# Patient Record
Sex: FEMALE | Race: WHITE | NOT HISPANIC OR LATINO | Employment: OTHER | URBAN - METROPOLITAN AREA
[De-identification: names, ages, dates, MRNs, and addresses within clinical notes are randomized per-mention and may not be internally consistent; named-entity substitution may affect disease eponyms.]

---

## 2018-01-03 ENCOUNTER — APPOINTMENT (OUTPATIENT)
Dept: ADMISSIONS | Facility: MEDICAL CENTER | Age: 63
DRG: 027 | End: 2018-01-03
Attending: NEUROLOGICAL SURGERY
Payer: MEDICARE

## 2018-01-03 RX ORDER — LEVOTHYROXINE SODIUM 0.07 MG/1
75 TABLET ORAL
COMMUNITY

## 2018-01-03 RX ORDER — ACETAMINOPHEN 160 MG
1 TABLET,DISINTEGRATING ORAL DAILY
COMMUNITY

## 2018-01-03 RX ORDER — BRIMONIDINE TARTRATE AND TIMOLOL MALEATE 2; 5 MG/ML; MG/ML
1 SOLUTION OPHTHALMIC 2 TIMES DAILY
COMMUNITY

## 2018-01-03 RX ORDER — ALPRAZOLAM 0.5 MG/1
0.5 TABLET ORAL NIGHTLY PRN
COMMUNITY

## 2018-01-03 RX ORDER — BRINZOLAMIDE 10 MG/ML
1 SUSPENSION/ DROPS OPHTHALMIC 2 TIMES DAILY
COMMUNITY

## 2018-01-03 RX ORDER — SIMVASTATIN 20 MG
20 TABLET ORAL NIGHTLY
COMMUNITY

## 2018-01-03 RX ORDER — OMEPRAZOLE 20 MG/1
20 CAPSULE, DELAYED RELEASE ORAL DAILY
COMMUNITY

## 2018-01-08 ENCOUNTER — APPOINTMENT (OUTPATIENT)
Dept: RADIOLOGY | Facility: MEDICAL CENTER | Age: 63
DRG: 027 | End: 2018-01-08
Attending: NEUROLOGICAL SURGERY
Payer: MEDICARE

## 2018-01-08 ENCOUNTER — HOSPITAL ENCOUNTER (INPATIENT)
Facility: MEDICAL CENTER | Age: 63
LOS: 2 days | DRG: 027 | End: 2018-01-10
Attending: NEUROLOGICAL SURGERY | Admitting: NEUROLOGICAL SURGERY
Payer: MEDICARE

## 2018-01-08 LAB
ABO GROUP BLD: NORMAL
ABO GROUP BLD: NORMAL
BLD GP AB SCN SERPL QL: NORMAL
RH BLD: NORMAL

## 2018-01-08 PROCEDURE — 500445 HCHG HEMOSTAT, SURGICEL 4X8: Performed by: NEUROLOGICAL SURGERY

## 2018-01-08 PROCEDURE — 4A11X4G MONITORING OF PERIPHERAL NERVOUS ELECTRICAL ACTIVITY, INTRAOPERATIVE, EXTERNAL APPROACH: ICD-10-PCS | Performed by: NEUROLOGICAL SURGERY

## 2018-01-08 PROCEDURE — 500303 HCHG CLIP, SCALP: Performed by: NEUROLOGICAL SURGERY

## 2018-01-08 PROCEDURE — C1713 ANCHOR/SCREW BN/BN,TIS/BN: HCPCS | Performed by: NEUROLOGICAL SURGERY

## 2018-01-08 PROCEDURE — 86901 BLOOD TYPING SEROLOGIC RH(D): CPT

## 2018-01-08 PROCEDURE — 95937 NEUROMUSCULAR JUNCTION TEST: CPT | Performed by: NEUROLOGICAL SURGERY

## 2018-01-08 PROCEDURE — 95867 NDL EMG CRANIAL NRV MUSC UNI: CPT | Performed by: NEUROLOGICAL SURGERY

## 2018-01-08 PROCEDURE — 700102 HCHG RX REV CODE 250 W/ 637 OVERRIDE(OP)

## 2018-01-08 PROCEDURE — 88307 TISSUE EXAM BY PATHOLOGIST: CPT

## 2018-01-08 PROCEDURE — A9270 NON-COVERED ITEM OR SERVICE: HCPCS | Performed by: NEUROLOGICAL SURGERY

## 2018-01-08 PROCEDURE — 700101 HCHG RX REV CODE 250

## 2018-01-08 PROCEDURE — 110454 HCHG SHELL REV 250: Performed by: NEUROLOGICAL SURGERY

## 2018-01-08 PROCEDURE — 160035 HCHG PACU - 1ST 60 MINS PHASE I: Performed by: NEUROLOGICAL SURGERY

## 2018-01-08 PROCEDURE — 160042 HCHG SURGERY MINUTES - EA ADDL 1 MIN LEVEL 5: Performed by: NEUROLOGICAL SURGERY

## 2018-01-08 PROCEDURE — 95940 IONM IN OPERATNG ROOM 15 MIN: CPT | Performed by: NEUROLOGICAL SURGERY

## 2018-01-08 PROCEDURE — 770022 HCHG ROOM/CARE - ICU (200)

## 2018-01-08 PROCEDURE — 160048 HCHG OR STATISTICAL LEVEL 1-5: Performed by: NEUROLOGICAL SURGERY

## 2018-01-08 PROCEDURE — 501838 HCHG SUTURE GENERAL: Performed by: NEUROLOGICAL SURGERY

## 2018-01-08 PROCEDURE — 700111 HCHG RX REV CODE 636 W/ 250 OVERRIDE (IP): Performed by: NEUROLOGICAL SURGERY

## 2018-01-08 PROCEDURE — 500075 HCHG BLADE, CLIPPER NEURO: Performed by: NEUROLOGICAL SURGERY

## 2018-01-08 PROCEDURE — 160031 HCHG SURGERY MINUTES - 1ST 30 MINS LEVEL 5: Performed by: NEUROLOGICAL SURGERY

## 2018-01-08 PROCEDURE — 8E09XBZ COMPUTER ASSISTED PROCEDURE OF HEAD AND NECK REGION: ICD-10-PCS | Performed by: NEUROLOGICAL SURGERY

## 2018-01-08 PROCEDURE — 500122 HCHG BOVIE, BLADE: Performed by: NEUROLOGICAL SURGERY

## 2018-01-08 PROCEDURE — 86850 RBC ANTIBODY SCREEN: CPT

## 2018-01-08 PROCEDURE — C1725 CATH, TRANSLUMIN NON-LASER: HCPCS | Performed by: NEUROLOGICAL SURGERY

## 2018-01-08 PROCEDURE — 700111 HCHG RX REV CODE 636 W/ 250 OVERRIDE (IP)

## 2018-01-08 PROCEDURE — 700102 HCHG RX REV CODE 250 W/ 637 OVERRIDE(OP): Performed by: NEUROLOGICAL SURGERY

## 2018-01-08 PROCEDURE — A6222 GAUZE <=16 IN NO W/SAL W/O B: HCPCS | Performed by: NEUROLOGICAL SURGERY

## 2018-01-08 PROCEDURE — 700105 HCHG RX REV CODE 258: Performed by: NEUROLOGICAL SURGERY

## 2018-01-08 PROCEDURE — 160002 HCHG RECOVERY MINUTES (STAT): Performed by: NEUROLOGICAL SURGERY

## 2018-01-08 PROCEDURE — 95870 NDL EMG LMTD STD MUSC 1 XTR: CPT | Performed by: NEUROLOGICAL SURGERY

## 2018-01-08 PROCEDURE — A9270 NON-COVERED ITEM OR SERVICE: HCPCS

## 2018-01-08 PROCEDURE — 70553 MRI BRAIN STEM W/O & W/DYE: CPT

## 2018-01-08 PROCEDURE — 500331 HCHG COTTONOID, SURG PATTIE: Performed by: NEUROLOGICAL SURGERY

## 2018-01-08 PROCEDURE — 00BC0ZZ EXCISION OF CEREBELLUM, OPEN APPROACH: ICD-10-PCS | Performed by: NEUROLOGICAL SURGERY

## 2018-01-08 PROCEDURE — 95938 SOMATOSENSORY TESTING: CPT | Performed by: NEUROLOGICAL SURGERY

## 2018-01-08 PROCEDURE — 160009 HCHG ANES TIME/MIN: Performed by: NEUROLOGICAL SURGERY

## 2018-01-08 PROCEDURE — 86900 BLOOD TYPING SEROLOGIC ABO: CPT

## 2018-01-08 PROCEDURE — A6404 STERILE GAUZE > 48 SQ IN: HCPCS | Performed by: NEUROLOGICAL SURGERY

## 2018-01-08 PROCEDURE — 500889 HCHG PACK, NEURO: Performed by: NEUROLOGICAL SURGERY

## 2018-01-08 PROCEDURE — 501445 HCHG STAPLER, SKIN DISP: Performed by: NEUROLOGICAL SURGERY

## 2018-01-08 DEVICE — GRAFT DURAMATRIX ONLAY PLUS 1IN X 1IN OR 2.7CM X 2.75CM: Type: IMPLANTABLE DEVICE | Status: FUNCTIONAL

## 2018-01-08 DEVICE — PLATE NC DOGBONE 2-HOLE RIGID GOLD 0.6MM (6NCX4=24): Type: IMPLANTABLE DEVICE | Status: FUNCTIONAL

## 2018-01-08 DEVICE — TIP EXTENDED DURAL SEALANT AUTOSPRAY ADHERUS (5EA/PK): Type: IMPLANTABLE DEVICE | Status: FUNCTIONAL

## 2018-01-08 DEVICE — SCREW STRYK NC 1.5X5MM (6NCX40=240) (80EA/PK) CONSIGNED QTY 240 PRE-LOAD: Type: IMPLANTABLE DEVICE | Status: FUNCTIONAL

## 2018-01-08 RX ORDER — ENEMA 19; 7 G/133ML; G/133ML
1 ENEMA RECTAL
Status: DISCONTINUED | OUTPATIENT
Start: 2018-01-08 | End: 2018-01-10 | Stop reason: HOSPADM

## 2018-01-08 RX ORDER — GINSENG 100 MG
CAPSULE ORAL
Status: DISCONTINUED | OUTPATIENT
Start: 2018-01-08 | End: 2018-01-08 | Stop reason: HOSPADM

## 2018-01-08 RX ORDER — HYDROMORPHONE HYDROCHLORIDE 2 MG/ML
0.5 INJECTION, SOLUTION INTRAMUSCULAR; INTRAVENOUS; SUBCUTANEOUS
Status: DISCONTINUED | OUTPATIENT
Start: 2018-01-08 | End: 2018-01-10 | Stop reason: HOSPADM

## 2018-01-08 RX ORDER — BISACODYL 10 MG
10 SUPPOSITORY, RECTAL RECTAL
Status: DISCONTINUED | OUTPATIENT
Start: 2018-01-08 | End: 2018-01-10 | Stop reason: HOSPADM

## 2018-01-08 RX ORDER — SIMVASTATIN 20 MG
20 TABLET ORAL NIGHTLY
Status: DISCONTINUED | OUTPATIENT
Start: 2018-01-08 | End: 2018-01-10 | Stop reason: HOSPADM

## 2018-01-08 RX ORDER — BRIMONIDINE TARTRATE AND TIMOLOL MALEATE 2; 5 MG/ML; MG/ML
1 SOLUTION OPHTHALMIC 2 TIMES DAILY
Status: DISCONTINUED | OUTPATIENT
Start: 2018-01-08 | End: 2018-01-10 | Stop reason: HOSPADM

## 2018-01-08 RX ORDER — OXYCODONE HYDROCHLORIDE 10 MG/1
10 TABLET ORAL
Status: DISCONTINUED | OUTPATIENT
Start: 2018-01-08 | End: 2018-01-10 | Stop reason: HOSPADM

## 2018-01-08 RX ORDER — CEFAZOLIN SODIUM 2 G/100ML
2 INJECTION, SOLUTION INTRAVENOUS EVERY 8 HOURS
Status: COMPLETED | OUTPATIENT
Start: 2018-01-08 | End: 2018-01-09

## 2018-01-08 RX ORDER — LIDOCAINE HYDROCHLORIDE 10 MG/ML
INJECTION, SOLUTION INFILTRATION; PERINEURAL
Status: COMPLETED
Start: 2018-01-08 | End: 2018-01-08

## 2018-01-08 RX ORDER — ONDANSETRON 2 MG/ML
4 INJECTION INTRAMUSCULAR; INTRAVENOUS EVERY 4 HOURS PRN
Status: DISCONTINUED | OUTPATIENT
Start: 2018-01-08 | End: 2018-01-10 | Stop reason: HOSPADM

## 2018-01-08 RX ORDER — LATANOPROST 50 UG/ML
1 SOLUTION/ DROPS OPHTHALMIC EVERY EVENING
Status: DISCONTINUED | OUTPATIENT
Start: 2018-01-08 | End: 2018-01-08

## 2018-01-08 RX ORDER — SCOLOPAMINE TRANSDERMAL SYSTEM 1 MG/1
PATCH, EXTENDED RELEASE TRANSDERMAL
Status: DISPENSED
Start: 2018-01-08 | End: 2018-01-08

## 2018-01-08 RX ORDER — HYDRALAZINE HYDROCHLORIDE 20 MG/ML
5-10 INJECTION INTRAMUSCULAR; INTRAVENOUS
Status: DISCONTINUED | OUTPATIENT
Start: 2018-01-08 | End: 2018-01-10 | Stop reason: HOSPADM

## 2018-01-08 RX ORDER — DIPHENHYDRAMINE HYDROCHLORIDE 50 MG/ML
25 INJECTION INTRAMUSCULAR; INTRAVENOUS EVERY 6 HOURS PRN
Status: DISCONTINUED | OUTPATIENT
Start: 2018-01-08 | End: 2018-01-10 | Stop reason: HOSPADM

## 2018-01-08 RX ORDER — POLYETHYLENE GLYCOL 3350 17 G/17G
1 POWDER, FOR SOLUTION ORAL 2 TIMES DAILY PRN
Status: DISCONTINUED | OUTPATIENT
Start: 2018-01-08 | End: 2018-01-10 | Stop reason: HOSPADM

## 2018-01-08 RX ORDER — OMEPRAZOLE 20 MG/1
20 CAPSULE, DELAYED RELEASE ORAL DAILY
Status: DISCONTINUED | OUTPATIENT
Start: 2018-01-09 | End: 2018-01-10 | Stop reason: HOSPADM

## 2018-01-08 RX ORDER — LIDOCAINE HYDROCHLORIDE 10 MG/ML
0.5 INJECTION, SOLUTION INFILTRATION; PERINEURAL
Status: ACTIVE | OUTPATIENT
Start: 2018-01-08 | End: 2018-01-09

## 2018-01-08 RX ORDER — SCOLOPAMINE TRANSDERMAL SYSTEM 1 MG/1
1 PATCH, EXTENDED RELEASE TRANSDERMAL
Status: DISCONTINUED | OUTPATIENT
Start: 2018-01-08 | End: 2018-01-10 | Stop reason: HOSPADM

## 2018-01-08 RX ORDER — ALPRAZOLAM 0.5 MG/1
0.5 TABLET ORAL NIGHTLY PRN
Status: DISCONTINUED | OUTPATIENT
Start: 2018-01-08 | End: 2018-01-10 | Stop reason: HOSPADM

## 2018-01-08 RX ORDER — PROMETHAZINE HYDROCHLORIDE 25 MG/1
6.25 TABLET ORAL EVERY 6 HOURS PRN
Status: DISCONTINUED | OUTPATIENT
Start: 2018-01-08 | End: 2018-01-08

## 2018-01-08 RX ORDER — OXYCODONE HYDROCHLORIDE 5 MG/1
5 TABLET ORAL
Status: DISCONTINUED | OUTPATIENT
Start: 2018-01-08 | End: 2018-01-10 | Stop reason: HOSPADM

## 2018-01-08 RX ORDER — LIDOCAINE AND PRILOCAINE 25; 25 MG/G; MG/G
1 CREAM TOPICAL
Status: ACTIVE | OUTPATIENT
Start: 2018-01-08 | End: 2018-01-09

## 2018-01-08 RX ORDER — DEXAMETHASONE SODIUM PHOSPHATE 4 MG/ML
4 INJECTION, SOLUTION INTRA-ARTICULAR; INTRALESIONAL; INTRAMUSCULAR; INTRAVENOUS; SOFT TISSUE
Status: COMPLETED | OUTPATIENT
Start: 2018-01-08 | End: 2018-01-08

## 2018-01-08 RX ORDER — BRIMONIDINE TARTRATE AND TIMOLOL MALEATE 2; 5 MG/ML; MG/ML
1 SOLUTION OPHTHALMIC 2 TIMES DAILY
Status: DISCONTINUED | OUTPATIENT
Start: 2018-01-08 | End: 2018-01-08

## 2018-01-08 RX ORDER — PROMETHAZINE HYDROCHLORIDE 12.5 MG/1
6.25 SUPPOSITORY RECTAL EVERY 6 HOURS PRN
Status: DISCONTINUED | OUTPATIENT
Start: 2018-01-08 | End: 2018-01-10 | Stop reason: HOSPADM

## 2018-01-08 RX ORDER — AMOXICILLIN 250 MG
1 CAPSULE ORAL
Status: DISCONTINUED | OUTPATIENT
Start: 2018-01-08 | End: 2018-01-10 | Stop reason: HOSPADM

## 2018-01-08 RX ORDER — LIDOCAINE HYDROCHLORIDE AND EPINEPHRINE 10; 10 MG/ML; UG/ML
INJECTION, SOLUTION INFILTRATION; PERINEURAL
Status: DISCONTINUED | OUTPATIENT
Start: 2018-01-08 | End: 2018-01-08 | Stop reason: HOSPADM

## 2018-01-08 RX ORDER — AMOXICILLIN 250 MG
1 CAPSULE ORAL NIGHTLY
Status: DISCONTINUED | OUTPATIENT
Start: 2018-01-08 | End: 2018-01-10 | Stop reason: HOSPADM

## 2018-01-08 RX ORDER — CLONIDINE HYDROCHLORIDE 0.1 MG/1
0.1 TABLET ORAL EVERY 4 HOURS PRN
Status: DISCONTINUED | OUTPATIENT
Start: 2018-01-08 | End: 2018-01-10 | Stop reason: HOSPADM

## 2018-01-08 RX ORDER — SODIUM CHLORIDE 9 MG/ML
INJECTION, SOLUTION INTRAVENOUS CONTINUOUS
Status: DISCONTINUED | OUTPATIENT
Start: 2018-01-08 | End: 2018-01-08

## 2018-01-08 RX ORDER — LORAZEPAM 2 MG/ML
.5-1 INJECTION INTRAMUSCULAR EVERY 8 HOURS PRN
Status: DISCONTINUED | OUTPATIENT
Start: 2018-01-08 | End: 2018-01-10 | Stop reason: HOSPADM

## 2018-01-08 RX ORDER — DEXAMETHASONE 4 MG/1
4 TABLET ORAL EVERY 6 HOURS
Status: DISCONTINUED | OUTPATIENT
Start: 2018-01-08 | End: 2018-01-10 | Stop reason: HOSPADM

## 2018-01-08 RX ORDER — DOCUSATE SODIUM 100 MG/1
100 CAPSULE, LIQUID FILLED ORAL 2 TIMES DAILY
Status: DISCONTINUED | OUTPATIENT
Start: 2018-01-08 | End: 2018-01-10 | Stop reason: HOSPADM

## 2018-01-08 RX ORDER — LABETALOL HYDROCHLORIDE 5 MG/ML
5-10 INJECTION, SOLUTION INTRAVENOUS
Status: DISCONTINUED | OUTPATIENT
Start: 2018-01-08 | End: 2018-01-10 | Stop reason: HOSPADM

## 2018-01-08 RX ORDER — LEVOTHYROXINE SODIUM 0.07 MG/1
75 TABLET ORAL
Status: DISCONTINUED | OUTPATIENT
Start: 2018-01-09 | End: 2018-01-10 | Stop reason: HOSPADM

## 2018-01-08 RX ORDER — BRINZOLAMIDE 10 MG/ML
1 SUSPENSION/ DROPS OPHTHALMIC 2 TIMES DAILY
Status: DISCONTINUED | OUTPATIENT
Start: 2018-01-08 | End: 2018-01-10 | Stop reason: HOSPADM

## 2018-01-08 RX ORDER — BACITRACIN 50000 [IU]/1
INJECTION, POWDER, FOR SOLUTION INTRAMUSCULAR
Status: DISCONTINUED | OUTPATIENT
Start: 2018-01-08 | End: 2018-01-08 | Stop reason: HOSPADM

## 2018-01-08 RX ORDER — ACETAMINOPHEN 500 MG
1000 TABLET ORAL EVERY 6 HOURS
Status: DISCONTINUED | OUTPATIENT
Start: 2018-01-08 | End: 2018-01-10 | Stop reason: HOSPADM

## 2018-01-08 RX ORDER — SODIUM CHLORIDE 9 MG/ML
INJECTION, SOLUTION INTRAVENOUS CONTINUOUS
Status: DISCONTINUED | OUTPATIENT
Start: 2018-01-08 | End: 2018-01-10 | Stop reason: HOSPADM

## 2018-01-08 RX ORDER — BRINZOLAMIDE 10 MG/ML
1 SUSPENSION/ DROPS OPHTHALMIC 2 TIMES DAILY
Status: DISCONTINUED | OUTPATIENT
Start: 2018-01-08 | End: 2018-01-08

## 2018-01-08 RX ADMIN — ONDANSETRON 4 MG: 2 INJECTION INTRAMUSCULAR; INTRAVENOUS at 15:56

## 2018-01-08 RX ADMIN — ONDANSETRON 4 MG: 2 INJECTION INTRAMUSCULAR; INTRAVENOUS at 20:11

## 2018-01-08 RX ADMIN — SCOLOPAMINE TRANSDERMAL SYSTEM 1 PATCH: 1 PATCH, EXTENDED RELEASE TRANSDERMAL at 17:16

## 2018-01-08 RX ADMIN — CEFAZOLIN SODIUM 2 G: 2 INJECTION, SOLUTION INTRAVENOUS at 23:35

## 2018-01-08 RX ADMIN — LIDOCAINE HYDROCHLORIDE 0.5 ML: 10 INJECTION, SOLUTION INFILTRATION; PERINEURAL at 06:15

## 2018-01-08 RX ADMIN — WHITE PETROLATUM MINERAL OIL 1 APPLICATION: 150; 830 OINTMENT OPHTHALMIC at 20:44

## 2018-01-08 RX ADMIN — FENTANYL CITRATE 50 MCG: 50 INJECTION, SOLUTION INTRAMUSCULAR; INTRAVENOUS at 15:16

## 2018-01-08 RX ADMIN — LATANOPROST 1 DROP: 50 SOLUTION OPHTHALMIC at 20:01

## 2018-01-08 RX ADMIN — SODIUM CHLORIDE: 9 INJECTION, SOLUTION INTRAVENOUS at 16:03

## 2018-01-08 RX ADMIN — BRINZOLAMIDE 1 DROP: 10 SUSPENSION/ DROPS OPHTHALMIC at 21:44

## 2018-01-08 RX ADMIN — LORAZEPAM 1 MG: 2 INJECTION, SOLUTION INTRAMUSCULAR; INTRAVENOUS at 21:31

## 2018-01-08 RX ADMIN — CEFAZOLIN SODIUM 2 G: 2 INJECTION, SOLUTION INTRAVENOUS at 17:32

## 2018-01-08 RX ADMIN — BRIMONIDINE TARTRATE AND TIMOLOL MALEATE 1 DROP: 2; 5 SOLUTION OPHTHALMIC at 21:43

## 2018-01-08 RX ADMIN — PROCHLORPERAZINE EDISYLATE 5 MG: 5 INJECTION INTRAMUSCULAR; INTRAVENOUS at 20:40

## 2018-01-08 RX ADMIN — SODIUM CHLORIDE: 9 INJECTION, SOLUTION INTRAVENOUS at 06:15

## 2018-01-08 RX ADMIN — DIPHENHYDRAMINE HYDROCHLORIDE 25 MG: 50 INJECTION, SOLUTION INTRAMUSCULAR; INTRAVENOUS at 16:32

## 2018-01-08 RX ADMIN — PROMETHAZINE HYDROCHLORIDE 6.25 MG: 12.5 SUPPOSITORY RECTAL at 17:16

## 2018-01-08 RX ADMIN — DEXAMETHASONE SODIUM PHOSPHATE 4 MG: 4 INJECTION, SOLUTION INTRAMUSCULAR; INTRAVENOUS at 16:44

## 2018-01-08 RX ADMIN — PROCHLORPERAZINE EDISYLATE 5 MG: 5 INJECTION INTRAMUSCULAR; INTRAVENOUS at 18:09

## 2018-01-08 ASSESSMENT — PAIN SCALES - GENERAL
PAINLEVEL_OUTOF10: 0
PAINLEVEL_OUTOF10: 1
PAINLEVEL_OUTOF10: 0
PAINLEVEL_OUTOF10: 4
PAINLEVEL_OUTOF10: 1
PAINLEVEL_OUTOF10: 0
PAINLEVEL_OUTOF10: 2
PAINLEVEL_OUTOF10: 6
PAINLEVEL_OUTOF10: 2

## 2018-01-08 ASSESSMENT — PATIENT HEALTH QUESTIONNAIRE - PHQ9
1. LITTLE INTEREST OR PLEASURE IN DOING THINGS: NOT AT ALL
SUM OF ALL RESPONSES TO PHQ QUESTIONS 1-9: 0
SUM OF ALL RESPONSES TO PHQ9 QUESTIONS 1 AND 2: 0

## 2018-01-09 ENCOUNTER — APPOINTMENT (OUTPATIENT)
Dept: RADIOLOGY | Facility: MEDICAL CENTER | Age: 63
DRG: 027 | End: 2018-01-09
Attending: NEUROLOGICAL SURGERY
Payer: MEDICARE

## 2018-01-09 PROBLEM — D33.1 BENIGN NEOPLASM OF INFRATENTORIAL REGION OF BRAIN (HCC): Status: ACTIVE | Noted: 2018-01-09

## 2018-01-09 LAB
ANION GAP SERPL CALC-SCNC: 7 MMOL/L (ref 0–11.9)
BUN SERPL-MCNC: 8 MG/DL (ref 8–22)
CALCIUM SERPL-MCNC: 8.1 MG/DL (ref 8.5–10.5)
CHLORIDE SERPL-SCNC: 110 MMOL/L (ref 96–112)
CO2 SERPL-SCNC: 22 MMOL/L (ref 20–33)
CREAT SERPL-MCNC: 0.54 MG/DL (ref 0.5–1.4)
ERYTHROCYTE [DISTWIDTH] IN BLOOD BY AUTOMATED COUNT: 42.5 FL (ref 35.9–50)
GLUCOSE SERPL-MCNC: 122 MG/DL (ref 65–99)
HCT VFR BLD AUTO: 35.2 % (ref 37–47)
HGB BLD-MCNC: 12.1 G/DL (ref 12–16)
MCH RBC QN AUTO: 31.9 PG (ref 27–33)
MCHC RBC AUTO-ENTMCNC: 34.4 G/DL (ref 33.6–35)
MCV RBC AUTO: 92.9 FL (ref 81.4–97.8)
PLATELET # BLD AUTO: 231 K/UL (ref 164–446)
PMV BLD AUTO: 9.4 FL (ref 9–12.9)
POTASSIUM SERPL-SCNC: 3.2 MMOL/L (ref 3.6–5.5)
RBC # BLD AUTO: 3.79 M/UL (ref 4.2–5.4)
SODIUM SERPL-SCNC: 139 MMOL/L (ref 135–145)
WBC # BLD AUTO: 10.8 K/UL (ref 4.8–10.8)

## 2018-01-09 PROCEDURE — G8978 MOBILITY CURRENT STATUS: HCPCS | Mod: CJ

## 2018-01-09 PROCEDURE — 700112 HCHG RX REV CODE 229: Performed by: NEUROLOGICAL SURGERY

## 2018-01-09 PROCEDURE — A9579 GAD-BASE MR CONTRAST NOS,1ML: HCPCS | Performed by: NEUROLOGICAL SURGERY

## 2018-01-09 PROCEDURE — G8979 MOBILITY GOAL STATUS: HCPCS | Mod: CI

## 2018-01-09 PROCEDURE — 700111 HCHG RX REV CODE 636 W/ 250 OVERRIDE (IP): Performed by: NEUROLOGICAL SURGERY

## 2018-01-09 PROCEDURE — 700105 HCHG RX REV CODE 258: Performed by: NEUROLOGICAL SURGERY

## 2018-01-09 PROCEDURE — G8987 SELF CARE CURRENT STATUS: HCPCS | Mod: CJ

## 2018-01-09 PROCEDURE — 700117 HCHG RX CONTRAST REV CODE 255: Performed by: NEUROLOGICAL SURGERY

## 2018-01-09 PROCEDURE — 700102 HCHG RX REV CODE 250 W/ 637 OVERRIDE(OP): Performed by: NEUROLOGICAL SURGERY

## 2018-01-09 PROCEDURE — 770001 HCHG ROOM/CARE - MED/SURG/GYN PRIV*

## 2018-01-09 PROCEDURE — 80048 BASIC METABOLIC PNL TOTAL CA: CPT

## 2018-01-09 PROCEDURE — 70553 MRI BRAIN STEM W/O & W/DYE: CPT

## 2018-01-09 PROCEDURE — 97162 PT EVAL MOD COMPLEX 30 MIN: CPT

## 2018-01-09 PROCEDURE — A9270 NON-COVERED ITEM OR SERVICE: HCPCS | Performed by: NEUROLOGICAL SURGERY

## 2018-01-09 PROCEDURE — G8988 SELF CARE GOAL STATUS: HCPCS | Mod: CI

## 2018-01-09 PROCEDURE — 85027 COMPLETE CBC AUTOMATED: CPT

## 2018-01-09 PROCEDURE — 97165 OT EVAL LOW COMPLEX 30 MIN: CPT

## 2018-01-09 RX ORDER — SILICONES/ADHESIVE TAPE
COMBINATION PACKAGE (EA) TOPICAL 2 TIMES DAILY
Status: DISCONTINUED | OUTPATIENT
Start: 2018-01-09 | End: 2018-01-10 | Stop reason: HOSPADM

## 2018-01-09 RX ORDER — POTASSIUM CHLORIDE 20 MEQ/1
40 TABLET, EXTENDED RELEASE ORAL DAILY
Status: COMPLETED | OUTPATIENT
Start: 2018-01-09 | End: 2018-01-10

## 2018-01-09 RX ADMIN — SODIUM CHLORIDE: 9 INJECTION, SOLUTION INTRAVENOUS at 02:16

## 2018-01-09 RX ADMIN — PROCHLORPERAZINE EDISYLATE 5 MG: 5 INJECTION INTRAMUSCULAR; INTRAVENOUS at 17:27

## 2018-01-09 RX ADMIN — ACETAMINOPHEN 1000 MG: 500 TABLET, FILM COATED ORAL at 17:27

## 2018-01-09 RX ADMIN — BACITRACIN ZINC AND POLYMYXIN B SULFATE: 500; 10000 OINTMENT TOPICAL at 21:00

## 2018-01-09 RX ADMIN — POTASSIUM CHLORIDE 40 MEQ: 1500 TABLET, EXTENDED RELEASE ORAL at 08:35

## 2018-01-09 RX ADMIN — BRIMONIDINE TARTRATE AND TIMOLOL MALEATE 1 DROP: 2; 5 SOLUTION OPHTHALMIC at 18:42

## 2018-01-09 RX ADMIN — ALPRAZOLAM 0.5 MG: 0.5 TABLET ORAL at 00:07

## 2018-01-09 RX ADMIN — BRINZOLAMIDE 1 DROP: 10 SUSPENSION/ DROPS OPHTHALMIC at 08:38

## 2018-01-09 RX ADMIN — STANDARDIZED SENNA CONCENTRATE AND DOCUSATE SODIUM 1 TABLET: 8.6; 5 TABLET, FILM COATED ORAL at 19:54

## 2018-01-09 RX ADMIN — VITAMIN D, TAB 1000IU (100/BT) 1000 UNITS: 25 TAB at 08:35

## 2018-01-09 RX ADMIN — SIMVASTATIN 20 MG: 20 TABLET, FILM COATED ORAL at 21:00

## 2018-01-09 RX ADMIN — ACETAMINOPHEN 1000 MG: 500 TABLET, FILM COATED ORAL at 23:12

## 2018-01-09 RX ADMIN — DEXAMETHASONE 4 MG: 4 TABLET ORAL at 13:07

## 2018-01-09 RX ADMIN — OMEPRAZOLE 20 MG: 20 CAPSULE, DELAYED RELEASE ORAL at 08:35

## 2018-01-09 RX ADMIN — BRIMONIDINE TARTRATE AND TIMOLOL MALEATE 1 DROP: 2; 5 SOLUTION OPHTHALMIC at 08:37

## 2018-01-09 RX ADMIN — DEXAMETHASONE 4 MG: 4 TABLET ORAL at 17:27

## 2018-01-09 RX ADMIN — ONDANSETRON 4 MG: 2 INJECTION INTRAMUSCULAR; INTRAVENOUS at 07:21

## 2018-01-09 RX ADMIN — BACITRACIN ZINC AND POLYMYXIN B SULFATE: 500; 10000 OINTMENT TOPICAL at 15:09

## 2018-01-09 RX ADMIN — GADODIAMIDE 20 ML: 287 INJECTION INTRAVENOUS at 23:07

## 2018-01-09 RX ADMIN — BRINZOLAMIDE 1 DROP: 10 SUSPENSION/ DROPS OPHTHALMIC at 18:26

## 2018-01-09 RX ADMIN — DOCUSATE SODIUM 100 MG: 100 CAPSULE ORAL at 19:54

## 2018-01-09 RX ADMIN — ACETAMINOPHEN 1000 MG: 500 TABLET, FILM COATED ORAL at 08:56

## 2018-01-09 RX ADMIN — PROCHLORPERAZINE EDISYLATE 5 MG: 5 INJECTION INTRAMUSCULAR; INTRAVENOUS at 07:37

## 2018-01-09 ASSESSMENT — LIFESTYLE VARIABLES
HOW MANY TIMES IN THE PAST YEAR HAVE YOU HAD 5 OR MORE DRINKS IN A DAY: 7
HAVE PEOPLE ANNOYED YOU BY CRITICIZING YOUR DRINKING: NO
AVERAGE NUMBER OF DAYS PER WEEK YOU HAVE A DRINK CONTAINING ALCOHOL: 7
CONSUMPTION TOTAL: POSITIVE
HAVE YOU EVER FELT YOU SHOULD CUT DOWN ON YOUR DRINKING: NO
EVER FELT BAD OR GUILTY ABOUT YOUR DRINKING: NO
TOTAL SCORE: 0
TOTAL SCORE: 0
EVER HAD A DRINK FIRST THING IN THE MORNING TO STEADY YOUR NERVES TO GET RID OF A HANGOVER: NO
DO YOU DRINK ALCOHOL: YES
EVER_SMOKED: YES
TOTAL SCORE: 0
ON A TYPICAL DAY WHEN YOU DRINK ALCOHOL HOW MANY DRINKS DO YOU HAVE: 2

## 2018-01-09 ASSESSMENT — PAIN SCALES - GENERAL
PAINLEVEL_OUTOF10: 4
PAINLEVEL_OUTOF10: 3
PAINLEVEL_OUTOF10: 2
PAINLEVEL_OUTOF10: 1
PAINLEVEL_OUTOF10: 6
PAINLEVEL_OUTOF10: 7
PAINLEVEL_OUTOF10: 3
PAINLEVEL_OUTOF10: 1
PAINLEVEL_OUTOF10: 1
PAINLEVEL_OUTOF10: 0
PAINLEVEL_OUTOF10: 5
PAINLEVEL_OUTOF10: 0

## 2018-01-09 ASSESSMENT — COGNITIVE AND FUNCTIONAL STATUS - GENERAL
TOILETING: A LITTLE
DAILY ACTIVITIY SCORE: 21
MOBILITY SCORE: 18
TURNING FROM BACK TO SIDE WHILE IN FLAT BAD: A LITTLE
DRESSING REGULAR LOWER BODY CLOTHING: A LITTLE
STANDING UP FROM CHAIR USING ARMS: A LITTLE
CLIMB 3 TO 5 STEPS WITH RAILING: A LITTLE
SUGGESTED CMS G CODE MODIFIER MOBILITY: CK
MOVING TO AND FROM BED TO CHAIR: A LITTLE
SUGGESTED CMS G CODE MODIFIER DAILY ACTIVITY: CJ
HELP NEEDED FOR BATHING: A LITTLE
MOVING FROM LYING ON BACK TO SITTING ON SIDE OF FLAT BED: A LITTLE
WALKING IN HOSPITAL ROOM: A LITTLE

## 2018-01-09 ASSESSMENT — GAIT ASSESSMENTS
DEVIATION: SHUFFLED GAIT;BRADYKINETIC
GAIT LEVEL OF ASSIST: CONTACT GUARD ASSIST
DISTANCE (FEET): 200

## 2018-01-09 ASSESSMENT — ACTIVITIES OF DAILY LIVING (ADL): TOILETING: INDEPENDENT

## 2018-01-09 NOTE — CARE PLAN
Problem: Communication  Goal: The ability to communicate needs accurately and effectively will improve  Outcome: PROGRESSING AS EXPECTED  Pt oriented to call light and able to communicate needs with staff effectively.    Problem: Pain Management  Goal: Pain level will decrease to patient's comfort goal  Outcome: PROGRESSING AS EXPECTED  Pt pain assessed every 2 hours and treated per MAR.

## 2018-01-09 NOTE — PROGRESS NOTES
"Neurosurgery Progress Note  Reason for Visit:  Resection of left cerebellopontine angle meningioma  Date of Surgery: 1/8/2018  Post-op Day: 1 Day Post-Op    Interval Events:  Nauseated, otherwise well    Exam:  /66   Pulse 95   Temp 36.9 °C (98.5 °F)   Resp (!) 63   Ht 1.702 m (5' 7\")   Wt 92.9 kg (204 lb 12.9 oz)   SpO2 95%   BMI 32.08 kg/m²     Alert, oriented  Pupils 3 mm, reactive  EOMI  Facial sensation intact  Face symmetric  Hearing intact to finger rub  Uvula midline  Tongue midline  No drift  Power 5/5 in all 4 extremities  Sensation normal in all 4 extremities  No dysmetria  Incision clean and dry    Ins/Outs:  Intake/Output       01/08/18 0700 - 01/09/18 0659 01/09/18 0700 - 01/10/18 0659      7092-3980 3765-4463 Total 3770-5238 0953-0287 Total       Intake    P.O.  15  30 45  --  -- --    P.O. 15 30 45 -- -- --    I.V.  4200  1300 5500  --  -- --    Crystalloid Intake 3800 -- 3800 -- -- --    IV Piggyback Volume (IV Piggyback) 100 100 200 -- -- --    IV Volume (IVF) 300 1200 1500 -- -- --    Total Intake 4215 1330 5545 -- -- --       Output    Urine  2550  950 3500  --  -- --    Indwelling Cathether  -- -- --    Void (ml) 2200 -- 2200 -- -- --    Emesis  350  -- 350  --  -- --    Emesis 350 -- 350 -- -- --    Emesis - Number of Times 2 x -- 2 x -- -- --    Stool  --  -- --  --  -- --    Number of Times Stooled 0 x -- 0 x -- -- --    Blood  200  -- 200  --  -- --    Est. Blood Loss (mL) 200 -- 200 -- -- --    Total Output 3100 950 4050 -- -- --       Net I/O     1748 169 9250 -- -- --          Intake/Output Summary (Last 24 hours) at 01/09/18 0751  Last data filed at 01/09/18 0600   Gross per 24 hour   Intake             5545 ml   Output             4050 ml   Net             1495 ml     Labs:  Recent Labs      01/09/18   0420   WBC  10.8   RBC  3.79*   HEMOGLOBIN  12.1   HEMATOCRIT  35.2*   MCV  92.9   MCH  31.9   MCHC  34.4   RDW  42.5   PLATELETCT  231   MPV  9.4     Recent Labs "      01/09/18   0420   SODIUM  139   POTASSIUM  3.2*   CHLORIDE  110   CO2  22   GLUCOSE  122*   BUN  8   CREATININE  0.54   CALCIUM  8.1*               Medications:  • alprazolam  0.5 mg HS PRN     • vitamin D  1,000 Units DAILY     • levothyroxine  75 mcg AM ES     • omeprazole  20 mg DAILY     • simvastatin  20 mg Nightly     • NS   Continuous 100 mL/hr at 01/09/18 0216   • Pharmacy Consult Request  1 Each PRN     • acetaminophen  1,000 mg Q6HRS     • oxycodone immediate-release  5 mg Q3HRS PRN     • oxycodone immediate release  10 mg Q3HRS PRN     • hydromorphone  0.5 mg Q3HRS PRN     • MD ALERT...Do not administer NSAIDS or ASPIRIN unless ORDERED By Neurosurgery  1 Each PRN     • ondansetron  4 mg Q4HRS PRN     • diphenhydrAMINE  25 mg Q6HRS PRN     • scopolamine  1 Patch Q72HRS PRN     • labetalol  5-10 mg Q10 MIN PRN     • hydrALAZINE  5-10 mg Q10 MIN PRN     • clonidine  0.1 mg Q4HRS PRN     • Nicardipine infusion  0-15 mg/hr Continuous Stopped (01/08/18 7950)   • docusate sodium  100 mg BID     • senna-docusate  1 Tab Nightly     • senna-docusate  1 Tab Q24HRS PRN     • polyethylene glycol/lytes  1 Packet BID PRN     • magnesium hydroxide  30 mL QDAY PRN     • bisacodyl  10 mg Q24HRS PRN     • fleet  1 Each Once PRN     • artificial tear ointment  1 Application Q8HRS     • benzocaine-menthol  1 Lozenge Q2HRS PRN     • dexamethasone  4 mg Q6HRS     • promethazine  6.25 mg Q6HRS PRN     • prochlorperazine  5-10 mg Q6HRS PRN     • lorazepam  0.5-1 mg Q8HRS PRN     • bimatoprost  1 Drop QHS     • Brimonidine Tartrate-Timolol  1 Drop BID     • brinzolamide  1 Drop BID       Imaging:  Pending    Quality:  Date of Admission: 1/8/2018  Hospital day #1  Post-operative antibiotics discontinued: yes Start date/time: 1/9/2018  De Dios catheter removed: yes Start date/time: 1/9/2018  Prophylactic anticoagulation: no Start date/time: in one month    Assessment and Plan:  POD #1  Neurologically intact  Nausea is common for  her; will replace K  Transfer to floor      Ash Tan M.D.

## 2018-01-09 NOTE — CARE PLAN
Problem: Safety  Goal: Will remain free from injury    Intervention: Provide assistance with mobility  Pt with bedrest order.      Problem: Skin Integrity  Goal: Risk for impaired skin integrity will decrease    Intervention: Assess risk factors for impaired skin integrity and/or pressure ulcers  2 RN skin assessment done upon pt's arrival to the unit.

## 2018-01-09 NOTE — PROGRESS NOTES
Pt still complains of Nausea and vomited 2 more times.  Available PRN medications already given.  Dr. Arambula updated and orders received.

## 2018-01-09 NOTE — PROGRESS NOTES
8102  Dr. Tan updated pt's continuous Nausea/vomitting.  Per the patient and pt's sister, only thing works for her N/V is 2mg IV ativan every 2 hours.  Order received to put the scopolamine patch and try phenergan.  Pt and pt's sister updated on the pan of care and was upset but this RN explained it is important that we try these medications first.  Both verbalized understanding and willing to try.

## 2018-01-09 NOTE — PROGRESS NOTES
1540  Pt arrived on the unit with PACU RN.  Bedside neuro check done.  VSS. A/Ox4, MORENO and no deficits.

## 2018-01-09 NOTE — THERAPY
"Physical Therapy Evaluation completed.   Bed Mobility:  Supine to Sit: Minimal Assist  Transfers: Sit to Stand: Minimal Assist  Gait: Level Of Assist: Contact Guard Assist with No Equipment Needed       Plan of Care: Will benefit from Physical Therapy 3 times per week  Discharge Recommendations: Equipment: No Equipment Needed. Post-acute therapy Discharge to home with outpatient or home health for additional skilled therapy services.    Pt presents just below baseline regarding functional mobility. Initially upon standing, pt was retropulsive requiring min A for correction. Once she initiated gait, pt required CGA and appeared more balanced. Anticipate as she mobilizes more she will self-progress. Will be seen 1x more for safety while she remains in-house.     See \"Rehab Therapy-Acute\" Patient Summary Report for complete documentation.     "

## 2018-01-09 NOTE — THERAPY
"Occupational Therapy Evaluation completed.   Functional Status:  Min/CGA with ADLs and txfs  Plan of Care: Will benefit from Occupational Therapy 2 times per week  Discharge Recommendations:  Equipment: Will Continue to Assess for Equipment Needs. Post-acute therapy Discharge to home with outpatient or home health for additional skilled therapy services. and Currently anticipate no further skilled therapy needs once patient is discharged from the inpatient setting.    See \"Rehab Therapy-Acute\" Patient Summary Report for complete documentation.    "

## 2018-01-09 NOTE — OP REPORT
Neurosurgery Operative Report    Patient: Indy Avalos    MRN: [id number]    Procedure date: 01/08/18    Procedure(s):  CRANIOTOMY STEALTH- SUBOCCIPITAL FOR RESECTION OF MENINGIOMA WITH NERVE MONITORING - Wound Class: Clean    #1. Left suboccipital craniotomy and resection of cerebellopontine angle meningioma  #2. Microscope  #3. Intraoperative image guidance using Med[employer]onic Stealth system  #4. Intraoperative electrophysiologic monitoring of cranial nerves V, VII, VIII, and X    Pre-Op Diagnosis: Left cerebellopontine angle meningioma    Post-Op Diagnosis: Left cerebellopontine angle meningioma    Surgeon: Ash Tan M.D.    Assistant: Waldo Arambula M.D.    Type of Anesthesia: General    Anesthesiologist: Jessica Vasquez M.D.    Surgical Staff: Circulator: Cordelia Comer R.N.  Relief Circulator: Chasity Peace R.N.  Scrub Person: Kelsey Quintanilla; Bert Ruiz  Steboo : Amanda Corrales R.N.    Clinical Preamble:  Indy Avalos is a 62-year-old woman who presents with left-sided tinnitus and headache. Investigations revealed a left cerebellopontine angle meningioma which appeared to be remote from the internal auditory canal and associated cranial nerves. There was progression in the size of the tumor on serial imaging, so a decision was made to proceed with resection of the tumor to obtain a definitive diagnosis and prevent further brain compromised.  The risks, benefits, and alternatives to surgery were discussed in detail with the patient preoperatively. The risks of the operation include, but are not limited to: risks associated with general anesthesia, bleeding, infection, CSF leak, incomplete resolution of the symptoms, need for future surgery, neurologic deficit (facial numbness, facial weakness, deafness, dysphagia, dysphonia, hemiplegia, quadriplegia), and/or death. Informed consent was obtained.    Description of Surgical Procedure:  The patient was brought  to the operating room and general anesthesia and endotracheal intubation were obtained. The patient was positioned in the left lateral decubitus position on a beanbag and an axillary roll and all bony prominences were well-padded. A De Dios catheter was inserted. Pneumatic compression devices were utilized. It was secured in Argueta 3 point fixation. Hair in the left suboccipital region was removed with electric clippers. The patient was registered with the intraoperative image guidance system and the surface projection of the tumor was marked on the scalp as well as the location of the transverse sinus and sigmoid sinus. The patient was given 2 g of Ancef within 60 minutes of making skin incision. A proposed incision was marked on the skin in a lazy S type incision extending above the pinna of the ear and aiming down towards the spinous process of C2. The patient was then prepared and draped in the usual sterile fashion using ChloraPrep. The proposed incision was infiltrated with 1% lidocaine with 1:200,000 epinephrine prior to making the skin incision.    Skin incision was made with a scalpel and then carried down through the subcutaneous tissues and suboccipital muscles with the monopolar cautery. Suboccipital muscles were dissected off the outer table of the skull of the subperiosteal plane and a self-retaining retractor was inserted. A carmine hole was placed at the junction of the transverse and sigmoid sinuses with the aid of the image guidance system. Another bur hole was made close to the foramen magnum, and the craniotomy flap was then turned with the Midas Lino high-speed drill. There was some bleeding from the sinus at the junction between the transverse and sigmoid sinuses and this bleeding was controlled with Gelfoam. Once the bleeding was controlled the dura was opened in a Y-shaped fashion with one limb aiming towards the junction of the transverse and sigmoid sinuses, one towards the inion, and one  towards the mastoid prominence.    Once the dura was opened the microscope was brought in and a brain retractor was used to gently retract the cerebellum and release some CSF from the cisterns. I decided that the best approach would be to resect the lateral portion of the cerebellar hemisphere in order to adequately expose the tumor and so subpial resection was performed using bipolar and suction. In this manner I was able to expose the posterior aspect of the tumor and there was a good arachnoid plane around it. Patties were inserted into this plane to help maintain it, and the dorsal aspect of the tumor was stimulated to ensure that there was no cranial nerve activity. There was some activity around orbicularis oris at 0.9, but this was most likely due to spread. The surface of the tumor was coagulated with bipolar cautery and then incised with microscissors and a internal decompression of the tumor was then performed using the ultrasonic aspirator. Several specimens were taken during the resection and sent to pathology for permanent section. Once the tumor was adequately decompressed internally I was able to pull on the capsule and developed the arachnoid plane surrounding the tumor  it from the brain. This was done circumferentially. The tumor came out quite readily, but there were a few vessels along the inferior pole of the tumor which were extremely adherent and difficult to dissect free. We were able to dissect these vessels and preserve them eventually.    Once the majority of the tumor had been evacuated there was still a carpet of tumor attached to the petrous bone and this was gently scraped off the petrous bone using a Penfield #1. I felt that a gross total tumor resection had been achieved. Hemostasis from the dura along the petrous bone and along the raw cerebellar hemisphere was controlled using a combination of Gelfoam and bipolar cautery. A layer of Surgicel was laid on the cerebellar  "cortex.    The dura was reapproximated using interrupted 4-0 Nurolon sutures. A layer of \"green glue\" fibrin sealant was placed on top of this and then a layer of DuraGen on top of the sealant. A small craniotomy flap was replaced and secured with Leibinger plates and screws. The wound was irrigated out thoroughly with bacitracin irrigation. The suboccipital muscles were reapproximated using interrupted 2-0 Vicryl sutures as were the subcutaneous tissues, and the skin was closed using running locking 3-0 nylon suture. A dressing was then applied. Patient tolerated the procedure well and there were no intraoperative complications. Estimated blood loss was 100 mL.    Estimated Blood Loss: 100 ml    Complications: None        Ash Tan M.D.        "

## 2018-01-09 NOTE — CARE PLAN
Problem: Knowledge Deficit  Goal: Knowledge of disease process/condition, treatment plan, diagnostic tests, and medications will improve  Reviewed POC with pt and family at bedside, questions and concerns addressed    Problem: Pain Management  Goal: Pain level will decrease to patient's comfort goal  Medicated per MAR for pain

## 2018-01-10 VITALS
OXYGEN SATURATION: 94 % | BODY MASS INDEX: 32.15 KG/M2 | SYSTOLIC BLOOD PRESSURE: 105 MMHG | WEIGHT: 204.81 LBS | RESPIRATION RATE: 16 BRPM | DIASTOLIC BLOOD PRESSURE: 76 MMHG | HEIGHT: 67 IN | TEMPERATURE: 96.7 F | HEART RATE: 87 BPM

## 2018-01-10 LAB
ANION GAP SERPL CALC-SCNC: 6 MMOL/L (ref 0–11.9)
BUN SERPL-MCNC: 8 MG/DL (ref 8–22)
CALCIUM SERPL-MCNC: 9.1 MG/DL (ref 8.5–10.5)
CHLORIDE SERPL-SCNC: 110 MMOL/L (ref 96–112)
CO2 SERPL-SCNC: 25 MMOL/L (ref 20–33)
CREAT SERPL-MCNC: 0.51 MG/DL (ref 0.5–1.4)
ERYTHROCYTE [DISTWIDTH] IN BLOOD BY AUTOMATED COUNT: 43.6 FL (ref 35.9–50)
GLUCOSE SERPL-MCNC: 108 MG/DL (ref 65–99)
HCT VFR BLD AUTO: 32.7 % (ref 37–47)
HGB BLD-MCNC: 10.9 G/DL (ref 12–16)
MCH RBC QN AUTO: 31.5 PG (ref 27–33)
MCHC RBC AUTO-ENTMCNC: 33.3 G/DL (ref 33.6–35)
MCV RBC AUTO: 94.5 FL (ref 81.4–97.8)
PLATELET # BLD AUTO: 218 K/UL (ref 164–446)
PMV BLD AUTO: 9.3 FL (ref 9–12.9)
POTASSIUM SERPL-SCNC: 3.7 MMOL/L (ref 3.6–5.5)
RBC # BLD AUTO: 3.46 M/UL (ref 4.2–5.4)
SODIUM SERPL-SCNC: 141 MMOL/L (ref 135–145)
WBC # BLD AUTO: 7.5 K/UL (ref 4.8–10.8)

## 2018-01-10 PROCEDURE — 85027 COMPLETE CBC AUTOMATED: CPT

## 2018-01-10 PROCEDURE — 700111 HCHG RX REV CODE 636 W/ 250 OVERRIDE (IP): Performed by: NEUROLOGICAL SURGERY

## 2018-01-10 PROCEDURE — A9270 NON-COVERED ITEM OR SERVICE: HCPCS | Performed by: NEUROLOGICAL SURGERY

## 2018-01-10 PROCEDURE — 700112 HCHG RX REV CODE 229: Performed by: NEUROLOGICAL SURGERY

## 2018-01-10 PROCEDURE — 700102 HCHG RX REV CODE 250 W/ 637 OVERRIDE(OP): Performed by: NEUROLOGICAL SURGERY

## 2018-01-10 PROCEDURE — 36415 COLL VENOUS BLD VENIPUNCTURE: CPT

## 2018-01-10 PROCEDURE — 80048 BASIC METABOLIC PNL TOTAL CA: CPT

## 2018-01-10 RX ORDER — DEXAMETHASONE 1 MG
TABLET ORAL
Qty: 15 TAB | Refills: 0 | Status: SHIPPED | OUTPATIENT
Start: 2018-01-10

## 2018-01-10 RX ADMIN — DEXAMETHASONE 4 MG: 4 TABLET ORAL at 01:12

## 2018-01-10 RX ADMIN — BRINZOLAMIDE 1 DROP: 10 SUSPENSION/ DROPS OPHTHALMIC at 08:26

## 2018-01-10 RX ADMIN — DEXAMETHASONE 4 MG: 4 TABLET ORAL at 11:38

## 2018-01-10 RX ADMIN — BACITRACIN ZINC AND POLYMYXIN B SULFATE: 500; 10000 OINTMENT TOPICAL at 08:26

## 2018-01-10 RX ADMIN — DEXAMETHASONE 4 MG: 4 TABLET ORAL at 05:47

## 2018-01-10 RX ADMIN — BRIMONIDINE TARTRATE AND TIMOLOL MALEATE 1 DROP: 2; 5 SOLUTION OPHTHALMIC at 08:25

## 2018-01-10 RX ADMIN — LEVOTHYROXINE SODIUM 75 MCG: 75 TABLET ORAL at 05:47

## 2018-01-10 RX ADMIN — ACETAMINOPHEN 1000 MG: 500 TABLET, FILM COATED ORAL at 11:38

## 2018-01-10 RX ADMIN — DOCUSATE SODIUM 100 MG: 100 CAPSULE ORAL at 08:26

## 2018-01-10 RX ADMIN — OMEPRAZOLE 20 MG: 20 CAPSULE, DELAYED RELEASE ORAL at 08:26

## 2018-01-10 RX ADMIN — ONDANSETRON 4 MG: 2 INJECTION INTRAMUSCULAR; INTRAVENOUS at 08:27

## 2018-01-10 RX ADMIN — VITAMIN D, TAB 1000IU (100/BT) 1000 UNITS: 25 TAB at 08:26

## 2018-01-10 RX ADMIN — PROCHLORPERAZINE EDISYLATE 10 MG: 5 INJECTION INTRAMUSCULAR; INTRAVENOUS at 01:12

## 2018-01-10 RX ADMIN — POTASSIUM CHLORIDE 40 MEQ: 1500 TABLET, EXTENDED RELEASE ORAL at 08:26

## 2018-01-10 RX ADMIN — ACETAMINOPHEN 1000 MG: 500 TABLET, FILM COATED ORAL at 05:47

## 2018-01-10 NOTE — DISCHARGE INSTRUCTIONS
Discharge Instructions    Discharged to home by car with relative. Discharged via wheelchair, hospital escort: Yes.  Special equipment needed: Not Applicable    Be sure to schedule a follow-up appointment with your primary care doctor or any specialists as instructed.     Discharge Plan:   Influenza Vaccine Indication: Not indicated: Previously immunized this influenza season and > 8 years of age    I understand that a diet low in cholesterol, fat, and sodium is recommended for good health. Unless I have been given specific instructions below for another diet, I accept this instruction as my diet prescription.   Other diet: Regular    Special Instructions: None    · Is patient discharged on Warfarin / Coumadin?   No     · Is patient Post Blood Transfusion?  No    Depression / Suicide Risk    As you are discharged from this [facility] facility, it is important to learn how to keep safe from harming yourself.    Recognize the warning signs:  · Abrupt changes in personality, positive or negative- including increase in energy   · Giving away possessions  · Change in eating patterns- significant weight changes-  positive or negative  · Change in sleeping patterns- unable to sleep or sleeping all the time   · Unwillingness or inability to communicate  · Depression  · Unusual sadness, discouragement and loneliness  · Talk of wanting to die  · Neglect of personal appearance   · Rebelliousness- reckless behavior  · Withdrawal from people/activities they love  · Confusion- inability to concentrate     If you or a loved one observes any of these behaviors or has concerns about self-harm, here's what you can do:  · Talk about it- your feelings and reasons for harming yourself  · Remove any means that you might use to hurt yourself (examples: pills, rope, extension cords, firearm)  · Get professional help from the community (Mental Health, Substance Abuse, psychological counseling)  · Do not be alone:Call your Safe Contact-  someone whom you trust who will be there for you.  · Call your local CRISIS HOTLINE 256-4652 or 989-292-1923  · Call your local Children's Mobile Crisis Response Team [place] (991) 973-1181 or www.HALFPOPS  · Call the toll free National Suicide Prevention Hotlines   · National Suicide Prevention Lifeline 900-582-ULZW (8815)  · [employer] Network 800-SUICIDE (910-9642)

## 2018-01-10 NOTE — PROGRESS NOTES
Pt arrived at approx 1800. Pt is AAOx4. Pt ambulated to BR and to bed on arrival. Eye drops given per pt request. Call ed kitchen for dinner. Snacks provided. Call light w/in reach. Bed alarm on. No other needs at this time.

## 2018-01-10 NOTE — CARE PLAN
Problem: Safety  Goal: Will remain free from injury  Outcome: PROGRESSING AS EXPECTED  Call light within reach. Bed alarm on.     Problem: Venous Thromboembolism (VTW)/Deep Vein Thrombosis (DVT) Prevention:  Goal: Patient will participate in Venous Thrombosis (VTE)/Deep Vein Thrombosis (DVT)Prevention Measures  Outcome: PROGRESSING AS EXPECTED

## 2018-01-10 NOTE — PROGRESS NOTES
Report called to Nancy KIRK, pt to transfer to [id number].  Pt and family aware of transfer.  All pt belongings, including phone, glasses and dentures sent with pt.  Pt to new room with CCT in wheelchair

## 2018-01-10 NOTE — DISCHARGE PLANNING
SW met with pt at bedside to discuss IMM. Pt initialed and was provided copy. Copy to chart.     Pt's  to  pt. Bedside nurse notified.

## 2018-01-10 NOTE — PROGRESS NOTES
"Neurosurgery Progress Note  Reason for Visit:  Resection of left cerebellopontine angle meningioma  Date of Surgery: 1/8/2018  Post-op Day: 2 Days Post-Op    Interval Events:  Nauseated, otherwise well  Mobilizing    Exam:  /56   Pulse 79   Temp 36.3 °C (97.4 °F)   Resp 16   Ht 1.702 m (5' 7\")   Wt 92.9 kg (204 lb 12.9 oz)   SpO2 91%   Breastfeeding? No   BMI 32.08 kg/m²     Alert, oriented  Pupils 3 mm, reactive  EOMI  Facial sensation intact  Face symmetric  Hearing intact to finger rub  Uvula midline  Tongue midline  No drift  Power 5/5 in all 4 extremities  Sensation normal in all 4 extremities  No dysmetria  Incision clean and dry    Ins/Outs:  Intake/Output       01/09/18 0700 - 01/10/18 0659 01/10/18 0700 - 01/11/18 0659      2702-1420 7500-4983 Total 0271-8990 3637-8605 Total       Intake    P.O.  1140  -- 1140  --  -- --    P.O. 1140 -- 1140 -- -- --    I.V.  200  -- 200  --  -- --    IV Volume (IVF) 200 -- 200 -- -- --    Total Intake 1340 -- 1340 -- -- --       Output    Urine  2730  -- 2730  --  -- --    Indwelling Cathether 300 -- 300 -- -- --    Void (ml) 2430 -- 2430 -- -- --    Total Output 2730 -- 2730 -- -- --       Net I/O     -1390 -- -1390 -- -- --          Intake/Output Summary (Last 24 hours) at 01/10/18 0809  Last data filed at 01/09/18 1700   Gross per 24 hour   Intake              780 ml   Output             2430 ml   Net            -1650 ml     Labs:  Recent Labs      01/09/18   0420  01/10/18   0440   WBC  10.8  7.5   RBC  3.79*  3.46*   HEMOGLOBIN  12.1  10.9*   HEMATOCRIT  35.2*  32.7*   MCV  92.9  94.5   MCH  31.9  31.5   MCHC  34.4  33.3*   RDW  42.5  43.6   PLATELETCT  231  218   MPV  9.4  9.3     Recent Labs      01/09/18   0420  01/10/18   0440   SODIUM  139  141   POTASSIUM  3.2*  3.7   CHLORIDE  110  110   CO2  22  25   GLUCOSE  122*  108*   BUN  8  8   CREATININE  0.54  0.51   CALCIUM  8.1*  9.1               Medications:  • potassium chloride SA  40 mEq DAILY   "   • bacitracin-polymyxin b   BID     • alprazolam  0.5 mg HS PRN     • vitamin D  1,000 Units DAILY     • levothyroxine  75 mcg AM ES     • omeprazole  20 mg DAILY     • simvastatin  20 mg Nightly     • NS   Continuous Stopped (01/09/18 1100)   • Pharmacy Consult Request  1 Each PRN     • acetaminophen  1,000 mg Q6HRS     • oxycodone immediate-release  5 mg Q3HRS PRN     • oxycodone immediate release  10 mg Q3HRS PRN     • hydromorphone  0.5 mg Q3HRS PRN     • MD ALERT...Do not administer NSAIDS or ASPIRIN unless ORDERED By Neurosurgery  1 Each PRN     • ondansetron  4 mg Q4HRS PRN     • diphenhydrAMINE  25 mg Q6HRS PRN     • scopolamine  1 Patch Q72HRS PRN     • labetalol  5-10 mg Q10 MIN PRN     • hydrALAZINE  5-10 mg Q10 MIN PRN     • clonidine  0.1 mg Q4HRS PRN     • Nicardipine infusion  0-15 mg/hr Continuous Stopped (01/08/18 9385)   • docusate sodium  100 mg BID     • senna-docusate  1 Tab Nightly     • senna-docusate  1 Tab Q24HRS PRN     • polyethylene glycol/lytes  1 Packet BID PRN     • magnesium hydroxide  30 mL QDAY PRN     • bisacodyl  10 mg Q24HRS PRN     • fleet  1 Each Once PRN     • artificial tear ointment  1 Application Q8HRS     • benzocaine-menthol  1 Lozenge Q2HRS PRN     • dexamethasone  4 mg Q6HRS     • promethazine  6.25 mg Q6HRS PRN     • prochlorperazine  5-10 mg Q6HRS PRN     • lorazepam  0.5-1 mg Q8HRS PRN     • bimatoprost  1 Drop QHS     • Brimonidine Tartrate-Timolol  1 Drop BID     • brinzolamide  1 Drop BID       Imaging:  Small residual tumor at junction of transverse sigmoid junction    Quality:  Date of Admission: 1/8/2018  Hospital day #2  Post-operative antibiotics discontinued: yes Start date/time: 1/9/2018  De Dios catheter removed: yes Start date/time: 1/9/2018  Prophylactic anticoagulation: no Start date/time: in one month    Assessment and Plan:  POD #2  Neurologically intact  K normal today  Discharge home today on tapering dose of Decadron  Follow-up in 2  shannon Tan M.D.

## 2018-01-10 NOTE — PROGRESS NOTES
Pt d/c'd IV removed. Discharge instructions done with patients with verbal understanding. Prescription paper for decadron sent with pt to be filled at pharmacy. All belongings with pt. Dressed and taken to discharge via wheelchair. Jaylen Rosen

## 2018-01-10 NOTE — DISCHARGE SUMMARY
Physician Discharge Summary     Patient ID:  Indy Avalos  [id number]  62 y.o.  1955    Admit date: 1/8/2018    Discharge date and time: 1/10/2018    Admitting Physician: Ash Tan M.D.     Discharge Physician: Ash Tan M.D.    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  Small residual tumor left.  Clinically patient had no facial numbness or weakness post-operatively.  Left tinnitus same a pre-op.  Mobilized well.  Had some ongoing nausea, but was eating well.    Disposition: home    Patient Instructions:   [unfilled]  Activity: activity as tolerated  Diet: regular diet  Wound Care: keep wound clean and dry    Follow-up with Dr. Angela in 2 weeks.    Signed:  Ash Tan  1/10/2018  8:45 AM

## (undated) DEVICE — NEEDLE, DISP 16G X 1-1/2 BLUNT

## (undated) DEVICE — DISSECT TOOL MIDAS REX - (M-2)

## (undated) DEVICE — GLOVE BIOGEL SZ 8 SURGICAL PF LTX - (50PR/BX 4BX/CA)

## (undated) DEVICE — TOWELS CLOTH SURGICAL - (4/PK 20PK/CA)

## (undated) DEVICE — MASK ANESTHESIA ADULT  - (100/CA)

## (undated) DEVICE — SCALP CLIP RANEY 20-1037 (10EA/PK 20PK/CA)

## (undated) DEVICE — PATTIES SURG X-RAYCOTTONOID - 1 X 3 IN (200/CA)

## (undated) DEVICE — PATTIES SURG X-RAYCOTTONOID - 1/2 X 3 IN (200/CA)

## (undated) DEVICE — ELECTRODE DUAL RETURN W/ CORD - (50/PK)

## (undated) DEVICE — PROTECTOR ULNA NERVE - (36PR/CA)

## (undated) DEVICE — KIT ROOM DECONTAMINATION

## (undated) DEVICE — SPHERE NAVIGATION STEALTH (5EA/TY 12TY/PK)

## (undated) DEVICE — KIT RADIAL ARTERY 20GA W/MAX BARRIER AND BIOPATCH  (5EA/CA) #10740 IS FOR THE SET RADIAL ARTERIAL

## (undated) DEVICE — LINER CANISTER SUCTION DISPOSABLE (12EA/PK)

## (undated) DEVICE — PIN HEAD MAYFIELD DISP. (3EA/PK 12PK/BX)

## (undated) DEVICE — TUBING C&T SET FLYING LEADS DRAIN TUBING (10EA/BX)

## (undated) DEVICE — GLOVE BIOGEL SZ 6 PF LATEX - (50EA/BX 4BX/CA)

## (undated) DEVICE — SYRINGE ASEPTO - (50EA/CA

## (undated) DEVICE — SET TUBING SONOPET (5EA/PK)

## (undated) DEVICE — SENSOR SPO2 NEO LNCS ADHESIVE (20/BX) SEE USER NOTES

## (undated) DEVICE — GLOVE BIOGEL PI INDICATOR SZ 7.0 SURGICAL PF LF - (50/BX 4BX/CA)

## (undated) DEVICE — SET LEADWIRE 5 LEAD BEDSIDE DISPOSABLE ECG (1SET OF 5/EA)

## (undated) DEVICE — HEMOSTAT SURG ABSORBABLE - 4 X 8 IN SURGICEL (24EA/CA)

## (undated) DEVICE — TIP STRAIGHT SONOPET (5EA/PK)

## (undated) DEVICE — DRAPE MEDI-SLUSH STERILE  - (40/CA)

## (undated) DEVICE — GLOVE BIOGEL INDICATOR SZ 7.5 SURGICAL PF LTX - (50PR/BX 4BX/CA)

## (undated) DEVICE — INTRAOP NEURO IN OR 1:1 PER 15 MIN

## (undated) DEVICE — SUTURE 4-0 NUROLON CR/8 TF - (12/BX) ETHICON

## (undated) DEVICE — SET EXTENSION WITH 2 PORTS (48EA/CA) ***PART #2C8610 IS A SUBSTITUTE*****

## (undated) DEVICE — Device

## (undated) DEVICE — SURGIFOAM (SIZE 100) - (6EA/CA)

## (undated) DEVICE — BOVIE BLADE COATED &INSULATED - 25/PK

## (undated) DEVICE — COVER PROBE STERILE CONE (12EA/CA)

## (undated) DEVICE — CONTAINER SPECIMEN BAG OR - STERILE 4 OZ W/LID (100EA/CA)

## (undated) DEVICE — TUBING CLEARLINK DUO-VENT - C-FLO (48EA/CA)

## (undated) DEVICE — MIDAS LUBRICATOR DIFFUSER PACK (4EA/CA)

## (undated) DEVICE — DRESSING NON-ADHERING 8 X 3 - (50/BX)

## (undated) DEVICE — NEEDLE NON SAFETY HYPO 22 GA X 1 1/2 IN (100/BX)

## (undated) DEVICE — NEPTUNE 4 PORT MANIFOLD - (20/PK)

## (undated) DEVICE — KIT ANESTHESIA W/CIRCUIT & 3/LT BAG W/FILTER (20EA/CA)

## (undated) DEVICE — DRESSING XEROFORM 1X8 - (50/BX 4BX/CA)

## (undated) DEVICE — PATTIES SURG NEURO X-RAY 1/2X1/2 (10EA/PK 20PK/CS)

## (undated) DEVICE — GLOVE BIOGEL SZ 7 SURGICAL PF LTX - (50PR/BX 4BX/CA)

## (undated) DEVICE — SLEEVE, VASO, THIGH, MED

## (undated) DEVICE — TRAY SURESTEP FOLEY TEMP SENSING 16FR (10EA/CA) ORDER  #18764 FOR TEMP FOLEY ONLY

## (undated) DEVICE — SUCTION INSTRUMENT YANKAUER BULBOUS TIP W/O VENT (50EA/CA)

## (undated) DEVICE — PACK CRANI - (1EA/CA)

## (undated) DEVICE — KIT SURGIFLO W/OUT THROMBIN - (6EA/CA)

## (undated) DEVICE — DISSECT TOOL MIDAS F2/8TA23

## (undated) DEVICE — CATHETER IV ANGIO 20GA X 2IN - ANGIOCATH (50/BX)

## (undated) DEVICE — TUBE CONNECT SUCTION CLEAR 120 X 1/4" (50EA/CA)"

## (undated) DEVICE — FORCEP BIPOLAR ISOCOOL 8.5 1.0MM TIP"

## (undated) DEVICE — GLOVE BIOGEL INDICATOR SZ 8.5 SURGICAL PF LTX - (50/BX 4BX/CA)

## (undated) DEVICE — SODIUM CHL IRRIGATION 0.9% 1000ML (12EA/CA)

## (undated) DEVICE — COVER LIGHT HANDLE FLEXIBLE - SOFT (2EA/PK 80PK/CA)

## (undated) DEVICE — LACTATED RINGERS INJ 1000 ML - (14EA/CA 60CA/PF)

## (undated) DEVICE — TUBE EMG NIM TRIVANTAGE 7MM (3EA/PK)

## (undated) DEVICE — PATTIES SURG NEURO X-RAY 1/4X1/4 (10EA/PK 20PK/CA)

## (undated) DEVICE — GOWN WARMING STANDARD FLEX - (30/CA)

## (undated) DEVICE — SUTURE GENERAL

## (undated) DEVICE — ELECTRODE 850 FOAM ADHESIVE - HYDROGEL RADIOTRNSPRNT (50/PK)

## (undated) DEVICE — GOWN SURGICAL XX-LARGE - (28EA/CA) SIRUS NON REINFORCED

## (undated) DEVICE — FIBRILLAR SURGICEL 4X4 - 10/CA

## (undated) DEVICE — STAPLER SKIN DISP - (6/BX 10BX/CA) VISISTAT

## (undated) DEVICE — BLADE CLIPPER FITS 2501 CLIPPER (BLUE)  (20EA/CA)

## (undated) DEVICE — GLOVE SZ 6.5 BIOGEL PI MICRO - PF LF (50PR/BX)

## (undated) DEVICE — SUTURE 3-0 ETHILON FS-1 - (36/BX) 30 INCH

## (undated) DEVICE — CHLORAPREP 26 ML APPLICATOR - ORANGE TINT(25/CA)

## (undated) DEVICE — CANISTER SUCTION 3000ML MECHANICAL FILTER AUTO SHUTOFF MEDI-VAC NONSTERILE LF DISP  (40EA/CA)

## (undated) DEVICE — BLANKET WARMING LOWER BODY - (10/CA) INACTIVE USE #8585